# Patient Record
(demographics unavailable — no encounter records)

---

## 2019-01-01 NOTE — CIRCUMCISION NOTE
=================================================================

Circumcision Note

=================================================================

Datetime Report Generated by CPN: 2019 19:30

   

   

=================================================================

PRIOR TO PROCEDURE

=================================================================

   

Consent Signed:  Verbal Consent Obtained; Written Consent Signed and on

   Chart

Position:  Supine; Papoose Board

Circumcision Time Out:  Correct Patient Identity; Accurate Procedure

   Consent Form; Agreement on Procedure to be Done; Correct Patient

   Position

   

=================================================================

PROCEDURE INFORMATION

=================================================================

   

Site Prep:  Sterile Drape

Circumcision Date/Time:  2019 09:10

Circumcision Performed By::  Haresh Spear MD

Systemic Medications:  Sweetease

Parents Present:  None

Provider Procedure Note:  Consent obtained. Site prepped with

   Chlorhexidine and draped in usual sterile fashion. Sweetease

   administered for comfort. 0.8 ml of 1% lidocaine used for dorsal

   penile block. Mogen used to excise redundant foreskin. Patient

   tolerated procedure well with excellent cosmetic outcome. Excellent

   hemostasis obtained. Vaseline gauze dressing applied.

   

=================================================================

SIGNATURE

=================================================================

   

Signature:  Electronically signed by Haresh Spear MD ([email or web address]) on

   2019 at 09:10  with User ID: [email or web address]

## 2019-01-01 NOTE — RADIOLOGY REPORT (SQ)
EXAM DESCRIPTION:  U/S SPINAL CANAL



COMPLETED DATE/TIME:  2019 3:32 pm



REASON FOR STUDY:  Atypical spinal dimple



COMPARISON:  None.



TECHNIQUE:  Ultrasound of the  spinal canal was performed from the thoracic spine down to the
 tip of the coccyx.  Gray scale and cine loop images saved to PACS.



LIMITATIONS:  None.



FINDINGS:  SPINE: No obvious bony deformities.  No posterior arch defects or dysraphism.

CORD: Conus at the L1-2.  No tethering.

SOFT TISSUES: No abnormal findings.  No fistula tract.  No pilonidal cyst is identified.

OTHER: No other significant findings.



IMPRESSION:  No ultrasound evidence of tethered cord or pilonidal cyst.  No ultrasound evidence of fi
stula from the skin to the spinal canal.



TECHNICAL DOCUMENTATION:  JOB ID:  [id number]

  [employer]- All Rights Reserved



Reading location - IP/workstation name: [email or web address]